# Patient Record
Sex: FEMALE | URBAN - METROPOLITAN AREA
[De-identification: names, ages, dates, MRNs, and addresses within clinical notes are randomized per-mention and may not be internally consistent; named-entity substitution may affect disease eponyms.]

---

## 2021-11-11 ENCOUNTER — OFFICE VISIT (OUTPATIENT)
Dept: URBAN - METROPOLITAN AREA CLINIC 1 | Facility: CLINIC | Age: 51
End: 2021-11-11
Payer: MEDICARE

## 2021-11-11 DIAGNOSIS — E11.3552 TYPE 2 DIABETES MELLITUS W/ STABLE PROLIFERATIVE DIABETIC RETINOPATHY OF LEFT EYE: ICD-10-CM

## 2021-11-11 DIAGNOSIS — H02.403 UNSPECIFIED PTOSIS OF BILATERAL EYELIDS: ICD-10-CM

## 2021-11-11 DIAGNOSIS — E11.3551 TYPE 2 DIABETES MELLITUS W/ STABLE PROLIFERATIVE DIABETIC RETINOPATHY OF RIGHT EYE: ICD-10-CM

## 2021-11-11 DIAGNOSIS — H25.13 AGE-RELATED NUCLEAR CATARACT, BILATERAL: Primary | ICD-10-CM

## 2021-11-11 PROCEDURE — 92134 CPTRZ OPH DX IMG PST SGM RTA: CPT | Performed by: SPECIALIST

## 2021-11-11 PROCEDURE — 99214 OFFICE O/P EST MOD 30 MIN: CPT | Performed by: SPECIALIST

## 2021-11-11 ASSESSMENT — INTRAOCULAR PRESSURE
OS: 13
OD: 14

## 2021-11-11 NOTE — IMPRESSION/PLAN
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 Plan:  Discussed diagnosis in detail with patient. Current therapy is best for patient. Patient to continue treatment with Dr. Jane Pickett.

## 2021-11-11 NOTE — IMPRESSION/PLAN
Impression: Unspecified ptosis of bilateral eyelids: H02.403.  Plan: Patient will be referred to Dr. Michael Hanley